# Patient Record
Sex: MALE | Race: WHITE | NOT HISPANIC OR LATINO
[De-identification: names, ages, dates, MRNs, and addresses within clinical notes are randomized per-mention and may not be internally consistent; named-entity substitution may affect disease eponyms.]

---

## 2021-06-03 PROBLEM — Z00.00 ENCOUNTER FOR PREVENTIVE HEALTH EXAMINATION: Status: ACTIVE | Noted: 2021-06-03

## 2021-08-30 ENCOUNTER — APPOINTMENT (OUTPATIENT)
Dept: NEUROLOGY | Facility: CLINIC | Age: 66
End: 2021-08-30

## 2021-08-30 ENCOUNTER — APPOINTMENT (OUTPATIENT)
Dept: NEUROLOGY | Facility: CLINIC | Age: 66
End: 2021-08-30
Payer: MEDICARE

## 2021-08-30 ENCOUNTER — LABORATORY RESULT (OUTPATIENT)
Age: 66
End: 2021-08-30

## 2021-08-30 VITALS
TEMPERATURE: 98 F | BODY MASS INDEX: 31.78 KG/M2 | DIASTOLIC BLOOD PRESSURE: 82 MMHG | HEIGHT: 70 IN | HEART RATE: 82 BPM | OXYGEN SATURATION: 94 % | SYSTOLIC BLOOD PRESSURE: 137 MMHG | WEIGHT: 222 LBS

## 2021-08-30 PROCEDURE — 99205 OFFICE O/P NEW HI 60 MIN: CPT

## 2021-08-30 NOTE — DISCUSSION/SUMMARY
[FreeTextEntry1] : Impression:\par 1) highly refractory OCD, disabling and institutionalized.\par \par Plan:\par 1) MRI brain\par 2) rEEG with next visit\par 3) screen for PANDAs and autoimmunity with serology.

## 2021-08-30 NOTE — HISTORY OF PRESENT ILLNESS
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 \par \[employer] was actually his happiest days of his life.  However he realized then he was attracted to member of his own sex.  This, along with perfectionism, led him to difficulty in his whole life.  He found that he was bullied in school, 'despite not being effeminate'.  He knows there is OCD in his family, in his mother's side.  As a child, he recalls seeing his mother checking and double checking the gas stove settings.\par \par When he was 7-9 yo, he believe he had strep throat, fevers to 107F, and he believes this led to PANDAS, in combination with the other two factors above, the genetic disposition and the guilt/sexual orientation issues while in [employer] school. \par \par He finds he needs to count all the time, everything he sees on the floor, and he needs to feel that everything is right and perfect and not affecting his life.  He has broken locks by rechecking them, though this has since passed.  He feels he is fixated on symmetry, but not fixated on specific numbers.\par \par He has vitiligo and eczema and inguinal itching as a child, which has changed over time, which he feels 'is a disaster'.  He also reports bowel movements are compulsive needing to feel completely empty.  He finds that his libido is increasing, and has always been very high.\par \par Seen by Dermatologists.\par Has used steroid creams -kenalog, steroid creams.\par Has used allegra and claritin, but did not work for him, but perhaps zyrtec helped.\par \wilfrido Has not been tested for ASO / Pandas.\par \par olanzepine, risperdal, aripirprazole, quetiapine.\par SSRI:  prozac, paxil, zoloft, lexapro, he only thing that works:  luvox\par wellbutrin.\par adderall, strattera.\par \par Neudexta is the only thing that is helping him. \par Does not recall being on: clozaril\par  \par Multiple hospitalizations:\par In 1984, his first inpatient psychiatric hospitalization, Sep 21-Oct 24, by memory.\par 1986, second admission, to [facility] for 4 months - imipramine 400, caused constipation.\par In 1993, admitted to [facility] psychiatry.\par ... others\par In 2001 he went to [facility]oc with [facility], for OCD symptoms.  \par He had a psychologist for 3 yrs, but recently stopped and a NP psychiatrist.\par Refractory to multiple medications and hospitalizations.  ECT x 8 treatments unhelpful.\par \par MRI's and EEG's, no PET scan.\par MRI and EEG last performed in 2014 to start neudexta.\par \par Now living in a nursing home.

## 2021-08-31 LAB
ALBUMIN SERPL ELPH-MCNC: 5.2 G/DL
ALP BLD-CCNC: 108 U/L
ALT SERPL-CCNC: 22 U/L
ANION GAP SERPL CALC-SCNC: 13 MMOL/L
ASO AB SER LA-ACNC: 501 IU/ML
AST SERPL-CCNC: 35 U/L
B BURGDOR IGG+IGM SER QL IB: NORMAL
BASOPHILS # BLD AUTO: 0.03 K/UL
BASOPHILS NFR BLD AUTO: 0.3 %
BILIRUB SERPL-MCNC: 1 MG/DL
BUN SERPL-MCNC: 19 MG/DL
CALCIUM SERPL-MCNC: 10.1 MG/DL
CHLORIDE SERPL-SCNC: 102 MMOL/L
CK SERPL-CCNC: 924 U/L
CO2 SERPL-SCNC: 24 MMOL/L
CREAT SERPL-MCNC: 0.95 MG/DL
CRP SERPL-MCNC: <3 MG/L
EOSINOPHIL # BLD AUTO: 0.04 K/UL
EOSINOPHIL NFR BLD AUTO: 0.5 %
ERYTHROCYTE [SEDIMENTATION RATE] IN BLOOD BY WESTERGREN METHOD: 14 MM/HR
ESTIMATED AVERAGE GLUCOSE: 114 MG/DL
FERRITIN SERPL-MCNC: 190 NG/ML
FOLATE SERPL-MCNC: 16 NG/ML
GLUCOSE SERPL-MCNC: 94 MG/DL
HBA1C MFR BLD HPLC: 5.6 %
HCT VFR BLD CALC: 47.6 %
HGB BLD-MCNC: 15.5 G/DL
HIV1+2 AB SPEC QL IA.RAPID: NONREACTIVE
IMM GRANULOCYTES NFR BLD AUTO: 0.3 %
LYMPHOCYTES # BLD AUTO: 2.24 K/UL
LYMPHOCYTES NFR BLD AUTO: 26.1 %
MAGNESIUM SERPL-MCNC: 2.2 MG/DL
MAN DIFF?: NORMAL
MCHC RBC-ENTMCNC: 28.9 PG
MCHC RBC-ENTMCNC: 32.6 GM/DL
MCV RBC AUTO: 88.6 FL
MONOCYTES # BLD AUTO: 0.81 K/UL
MONOCYTES NFR BLD AUTO: 9.4 %
NEUTROPHILS # BLD AUTO: 5.43 K/UL
NEUTROPHILS NFR BLD AUTO: 63.4 %
PLATELET # BLD AUTO: 275 K/UL
POTASSIUM SERPL-SCNC: 4.2 MMOL/L
PROT SERPL-MCNC: 7.9 G/DL
RBC # BLD: 5.37 M/UL
RBC # FLD: 13.5 %
RHEUMATOID FACT SER QL: <10 IU/ML
SODIUM SERPL-SCNC: 139 MMOL/L
T PALLIDUM AB SER QL IA: NEGATIVE
THYROGLOB AB SERPL-ACNC: <20 IU/ML
THYROPEROXIDASE AB SERPL IA-ACNC: 10.6 IU/ML
TSH SERPL-ACNC: 0.98 UIU/ML
VIT B12 SERPL-MCNC: >2000 PG/ML
WBC # FLD AUTO: 8.58 K/UL

## 2021-09-02 LAB
ACE BLD-CCNC: 24 U/L
ENA SS-A AB SER IA-ACNC: <0.2 AL
ENA SS-B AB SER IA-ACNC: <0.2 AL

## 2021-09-03 DIAGNOSIS — Z86.39 PERSONAL HISTORY OF OTHER ENDOCRINE, NUTRITIONAL AND METABOLIC DISEASE: ICD-10-CM

## 2021-09-03 DIAGNOSIS — Z87.898 PERSONAL HISTORY OF OTHER SPECIFIED CONDITIONS: ICD-10-CM

## 2021-09-03 DIAGNOSIS — E53.8 DEFICIENCY OF OTHER SPECIFIED B GROUP VITAMINS: ICD-10-CM

## 2021-09-03 DIAGNOSIS — Z86.59 PERSONAL HISTORY OF OTHER MENTAL AND BEHAVIORAL DISORDERS: ICD-10-CM

## 2021-09-03 DIAGNOSIS — Z78.9 OTHER SPECIFIED HEALTH STATUS: ICD-10-CM

## 2021-09-03 RX ORDER — CHLORHEXIDINE GLUCONATE 4 %
5 LIQUID (ML) TOPICAL
Qty: 30 | Refills: 3 | Status: ACTIVE | COMMUNITY

## 2021-09-03 RX ORDER — ALPRAZOLAM 1 MG/1
1 TABLET ORAL 3 TIMES DAILY
Refills: 0 | Status: ACTIVE | COMMUNITY

## 2021-09-03 RX ORDER — ATORVASTATIN CALCIUM 10 MG/1
10 TABLET, FILM COATED ORAL
Qty: 30 | Refills: 5 | Status: ACTIVE | COMMUNITY

## 2021-09-03 RX ORDER — DEXTROMETHORPHAN HYDROBROMIDE AND QUINIDINE SULFATE 20; 10 MG/1; MG/1
20-10 CAPSULE, GELATIN COATED ORAL
Refills: 0 | Status: ACTIVE | COMMUNITY

## 2021-09-15 LAB
A-TUMOR NECROSIS FACT SERPL-MCNC: <1.7 PG/ML
ALBUMIN MFR SERPL ELPH: 61 %
ALBUMIN SERPL-MCNC: 4.8 G/DL
ALBUMIN/GLOB SERPL: 1.5 RATIO
ALPHA1 GLOB MFR SERPL ELPH: 3.7 %
ALPHA1 GLOB SERPL ELPH-MCNC: 0.3 G/DL
ALPHA2 GLOB MFR SERPL ELPH: 8.8 %
ALPHA2 GLOB SERPL ELPH-MCNC: 0.7 G/DL
ANA SER IF-ACNC: NEGATIVE
B-GLOBULIN MFR SERPL ELPH: 11.9 %
B-GLOBULIN SERPL ELPH-MCNC: 0.9 G/DL
GAD65 AB SER-MCNC: 0 NMOL/L
GAMMA GLOB FLD ELPH-MCNC: 1.2 G/DL
GAMMA GLOB MFR SERPL ELPH: 14.6 %
IGNF SERPL-MCNC: <4.2 PG/ML
IL10 SERPL-MCNC: <2.8 PG/ML
IL12 SERPL-MCNC: <1.9 PG/ML
IL13 SERPL-MCNC: <1.7 PG/ML
IL17A SERPL-MCNC: <1.4 PG/ML
IL2 SERPL-MCNC: 273.1 PG/ML
IL2 SERPL-MCNC: <2.1 PG/ML
IL4 SERPL-MCNC: <2.2 PG/ML
IL6 SERPL-MCNC: <2 PG/ML
IL8 SERPL-MCNC: <3 PG/ML
INTERLEUKIN 1 BETA: <6.5 PG/ML
INTERLEUKIN 5: <2.1 PG/ML
INTERPRETATION SERPL IEP-IMP: NORMAL
PARANEOPLASTIC AB PNL SER: ABNORMAL
PROT SERPL-MCNC: 7.9 G/DL
PROT SERPL-MCNC: 7.9 G/DL
VGKC AB SER-SCNC: 12 PMOL/L

## 2021-09-17 ENCOUNTER — APPOINTMENT (OUTPATIENT)
Dept: MRI IMAGING | Facility: HOSPITAL | Age: 66
End: 2021-09-17

## 2021-09-30 ENCOUNTER — OUTPATIENT (OUTPATIENT)
Dept: OUTPATIENT SERVICES | Facility: HOSPITAL | Age: 66
LOS: 1 days | End: 2021-09-30
Payer: MEDICARE

## 2021-09-30 ENCOUNTER — RESULT REVIEW (OUTPATIENT)
Age: 66
End: 2021-09-30

## 2021-09-30 ENCOUNTER — APPOINTMENT (OUTPATIENT)
Dept: MRI IMAGING | Facility: HOSPITAL | Age: 66
End: 2021-09-30

## 2021-09-30 PROCEDURE — 70551 MRI BRAIN STEM W/O DYE: CPT | Mod: 26,MH

## 2021-09-30 PROCEDURE — 70551 MRI BRAIN STEM W/O DYE: CPT

## 2021-10-01 ENCOUNTER — NON-APPOINTMENT (OUTPATIENT)
Age: 66
End: 2021-10-01

## 2021-10-01 ENCOUNTER — APPOINTMENT (OUTPATIENT)
Dept: NEUROLOGY | Facility: CLINIC | Age: 66
End: 2021-10-01
Payer: MEDICARE

## 2021-10-01 VITALS
HEIGHT: 70 IN | DIASTOLIC BLOOD PRESSURE: 83 MMHG | WEIGHT: 220 LBS | OXYGEN SATURATION: 92 % | SYSTOLIC BLOOD PRESSURE: 134 MMHG | BODY MASS INDEX: 31.5 KG/M2 | HEART RATE: 93 BPM | TEMPERATURE: 97.2 F

## 2021-10-01 PROCEDURE — 99215 OFFICE O/P EST HI 40 MIN: CPT

## 2021-10-01 PROCEDURE — 95816 EEG AWAKE AND DROWSY: CPT

## 2021-10-12 ENCOUNTER — APPOINTMENT (OUTPATIENT)
Dept: OTOLARYNGOLOGY | Facility: CLINIC | Age: 66
End: 2021-10-12
Payer: MEDICARE

## 2021-10-12 VITALS
HEIGHT: 70 IN | DIASTOLIC BLOOD PRESSURE: 74 MMHG | TEMPERATURE: 96.2 F | BODY MASS INDEX: 32.35 KG/M2 | WEIGHT: 226 LBS | SYSTOLIC BLOOD PRESSURE: 121 MMHG | HEART RATE: 80 BPM

## 2021-10-12 DIAGNOSIS — H61.23 IMPACTED CERUMEN, BILATERAL: ICD-10-CM

## 2021-10-12 DIAGNOSIS — H91.93 UNSPECIFIED HEARING LOSS, BILATERAL: ICD-10-CM

## 2021-10-12 PROCEDURE — 99204 OFFICE O/P NEW MOD 45 MIN: CPT

## 2021-10-12 PROCEDURE — 69210 REMOVE IMPACTED EAR WAX UNI: CPT

## 2021-10-12 NOTE — PHYSICAL EXAM
[FreeTextEntry1] : Ad: EAC occluded w severe cerumen, removed w curet and suction. TM intact and mobile, ME clear\par \par marked improvement in hearing after disimpaction [Midline] : trachea located in midline position [Normal] : no rashes

## 2021-11-04 NOTE — HISTORY OF PRESENT ILLNESS
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 \par \par Accompanied by his sister today.\par \par Eval for a neuro-autoimmune disorder last visit included labs, which did show an elevated ASO and AChR shon-ganglionic antibody (alpha-3), thought relatively low titer at 0.04, the [place] lab estimated a 46% chance it could be causing neurological disease.  As well there is an increased risk it is related to a neoplasm.\par \par Background:  at  7-7 yo, he believe he had strep throat, fevers to 107F, and he believes this led to PANDAS, in combination with the other two factors above, the genetic disposition and the guilt/sexual orientation issues while in [employer]. \par \par He finds he needs to count all the time, everything he sees on the floor, and he needs to feel that everything is right and perfect and not affecting his life.  He has broken locks by rechecking them, though this has since passed.  He feels he is fixated on symmetry, but not fixated on specific numbers.\par \par He has vitiligo and eczema and inguinal itching as a child, which has changed over time, which he feels 'is a disaster'.  He also reports bowel movements are compulsive needing to feel completely empty.  He finds that his libido is increasing, and has always been very high.\par \par Seen by Dermatologists.\par Has used steroid creams -kenalog, steroid creams.\par Has used allegra and claritin, but did not work for him, but perhaps zyrtec helped.\par \par Prior trials, with failures: olanzepine, risperdal, aripirprazole, quetiapine.\par SSRI:  prozac, paxil, zoloft, lexapro, he only thing that works:  luvox\par wellbutrin.\par adderall, strattera.\par \par Neudexta is the only thing that is helping him. \par Does not recall being on: clozaril\par  \par Multiple hospitalizations:\par In 1984, his first inpatient psychiatric hospitalization, Sep 21-Oct 24, by memory.\par 1986, second admission, to [facility] for 4 months - imipramine 400, caused constipation.\par In 1993, admitted to [facility] psychiatry.\par ... others\par In 2001 he went to [facility]oc with [facility], for OCD symptoms.  \par He had a psychologist for 3 yrs, but recently stopped and a NP psychiatrist.\par Refractory to multiple medications and hospitalizations.  ECT x 8 treatments unhelpful.\par \par MRI's and EEG's, no PET scan.\par MRI and EEG last performed in 2014 to start neudexta.

## 2021-11-04 NOTE — PHYSICAL EXAM
[FreeTextEntry1] : General:\par Constitutional:  Sitting comfortably\par Psychiatric: well-groomed, agitated, cannot stop talking about the past and the severity of his symptoms.\par Ears, Nose, Throat: no abnormalities, mucus membranes moist\par Neck: supple\par Extremities: no edema, clubbing or cyanosis\par Skin: no rash or neuro-cutaneous signs \par \par Cognitive:\par Orientation, language, memory and knowledge screens intact.\par \par Cranial Nerves:\par II: MARILYNN. III/IV/VI: EOM Full.  VII: Face appears symmetric VIII: Normal to screening\par IX/X: normal phonation  XI: Trapezius Symmetric  XII: Tongue midline\par Motor:\par Power: no pronator drift\par \par Narrow based gait

## 2021-11-04 NOTE — DISCUSSION/SUMMARY
[FreeTextEntry1] : Impression:\par 1) highly refractory OCD, disabling and institutionalized.  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 \par \par Plan:\par 1) genetic testing for OCD to consider\par 2) neoplasm screen - CT CAP as oupt\par 3) lumbar puncture for paraneoplastic CSF panel, consider outpt given the severity of his symptoms and difficulty with others.\par 4) consider trial of minocycline for mild anit-inflammatory

## 2021-11-30 PROBLEM — Z00.00 ENCOUNTER FOR PREVENTIVE HEALTH EXAMINATION: Noted: 2021-11-30

## 2021-12-09 ENCOUNTER — APPOINTMENT (OUTPATIENT)
Dept: CT IMAGING | Facility: HOSPITAL | Age: 66
End: 2021-12-09
Payer: MEDICARE

## 2021-12-09 ENCOUNTER — OUTPATIENT (OUTPATIENT)
Dept: OUTPATIENT SERVICES | Facility: HOSPITAL | Age: 66
LOS: 1 days | End: 2021-12-09
Payer: MEDICARE

## 2021-12-09 ENCOUNTER — RESULT REVIEW (OUTPATIENT)
Age: 66
End: 2021-12-09

## 2021-12-09 ENCOUNTER — APPOINTMENT (OUTPATIENT)
Dept: NEUROLOGY | Facility: CLINIC | Age: 66
End: 2021-12-09
Payer: MEDICARE

## 2021-12-09 VITALS
DIASTOLIC BLOOD PRESSURE: 82 MMHG | BODY MASS INDEX: 31.92 KG/M2 | OXYGEN SATURATION: 95 % | SYSTOLIC BLOOD PRESSURE: 131 MMHG | TEMPERATURE: 98.1 F | HEART RATE: 79 BPM | WEIGHT: 223 LBS | HEIGHT: 70 IN

## 2021-12-09 DIAGNOSIS — G04.81 OTHER ENCEPHALITIS AND ENCEPHALOMYELITIS: ICD-10-CM

## 2021-12-09 PROCEDURE — 71271 CT THORAX LUNG CANCER SCR C-: CPT | Mod: 26

## 2021-12-09 PROCEDURE — 74178 CT ABD&PLV WO CNTR FLWD CNTR: CPT

## 2021-12-09 PROCEDURE — 74178 CT ABD&PLV WO CNTR FLWD CNTR: CPT | Mod: 26,MH

## 2021-12-09 PROCEDURE — 71250 CT THORAX DX C-: CPT | Mod: 26

## 2021-12-09 PROCEDURE — 99214 OFFICE O/P EST MOD 30 MIN: CPT

## 2021-12-09 PROCEDURE — 71271 CT THORAX LUNG CANCER SCR C-: CPT

## 2021-12-13 LAB
ALBUMIN SERPL ELPH-MCNC: 5 G/DL
ALP BLD-CCNC: 113 U/L
ALT SERPL-CCNC: 22 U/L
ANA SER IF-ACNC: NEGATIVE
ANION GAP SERPL CALC-SCNC: 15 MMOL/L
ASO AB SER LA-ACNC: 480 IU/ML
AST SERPL-CCNC: 34 U/L
B2 GLYCOPROT1 AB SER QL: NEGATIVE
BASOPHILS # BLD AUTO: 0.03 K/UL
BASOPHILS NFR BLD AUTO: 0.3 %
BILIRUB SERPL-MCNC: 0.6 MG/DL
BUN SERPL-MCNC: 22 MG/DL
C3 SERPL-MCNC: 142 MG/DL
C4 SERPL-MCNC: 34 MG/DL
CALCIUM SERPL-MCNC: 10.2 MG/DL
CH50 SERPL-MCNC: 93 U/ML
CHLORIDE SERPL-SCNC: 102 MMOL/L
CO2 SERPL-SCNC: 22 MMOL/L
COVID-19 NUCLEOCAPSID  GAM ANTIBODY INTERPRETATION: POSITIVE
COVID-19 SPIKE DOMAIN ANTIBODY INTERPRETATION: POSITIVE
CREAT SERPL-MCNC: 1.06 MG/DL
CRP SERPL-MCNC: <3 MG/L
DSDNA AB SER-ACNC: <12 IU/ML
EOSINOPHIL # BLD AUTO: 0.03 K/UL
EOSINOPHIL NFR BLD AUTO: 0.3 %
ERYTHROCYTE [SEDIMENTATION RATE] IN BLOOD BY WESTERGREN METHOD: 15 MM/HR
FOLATE SERPL-MCNC: 12.5 NG/ML
GLUCOSE SERPL-MCNC: 86 MG/DL
HCT VFR BLD CALC: 46.5 %
HGB BLD-MCNC: 15.1 G/DL
IMM GRANULOCYTES NFR BLD AUTO: 0.3 %
INR PPP: 1 RATIO
LYMPHOCYTES # BLD AUTO: 2.14 K/UL
LYMPHOCYTES NFR BLD AUTO: 21.4 %
MAN DIFF?: NORMAL
MCHC RBC-ENTMCNC: 28.8 PG
MCHC RBC-ENTMCNC: 32.5 GM/DL
MCV RBC AUTO: 88.7 FL
MONOCYTES # BLD AUTO: 1 K/UL
MONOCYTES NFR BLD AUTO: 10 %
NEUTROPHILS # BLD AUTO: 6.76 K/UL
NEUTROPHILS NFR BLD AUTO: 67.7 %
PLATELET # BLD AUTO: 281 K/UL
POTASSIUM SERPL-SCNC: 4.6 MMOL/L
PROT SERPL-MCNC: 7.9 G/DL
PT BLD: 11.8 SEC
RBC # BLD: 5.24 M/UL
RBC # FLD: 13.3 %
RHEUMATOID FACT SER QL: <10 IU/ML
SARS-COV-2 AB SERPL IA-ACNC: >250 U/ML
SARS-COV-2 AB SERPL QL IA: 89.4 INDEX
SODIUM SERPL-SCNC: 139 MMOL/L
THYROGLOB AB SERPL-ACNC: <20 IU/ML
THYROPEROXIDASE AB SERPL IA-ACNC: <10 IU/ML
TSH SERPL-ACNC: 1.36 UIU/ML
VIT B12 SERPL-MCNC: >2000 PG/ML
WBC # FLD AUTO: 9.99 K/UL

## 2021-12-14 LAB
A-TUMOR NECROSIS FACT SERPL-MCNC: <1.7 PG/ML
IGNF SERPL-MCNC: <4.2 PG/ML
IL10 SERPL-MCNC: 6.7 PG/ML
IL12 SERPL-MCNC: <1.9 PG/ML
IL13 SERPL-MCNC: <1.7 PG/ML
IL17A SERPL-MCNC: <1.4 PG/ML
IL2 SERPL-MCNC: 319.5 PG/ML
IL2 SERPL-MCNC: <2.1 PG/ML
IL4 SERPL-MCNC: <2.2 PG/ML
IL6 SERPL-MCNC: <2 PG/ML
IL8 SERPL-MCNC: <3 PG/ML
INTERLEUKIN 1 BETA: <6.5 PG/ML
INTERLEUKIN 5: <2.1 PG/ML

## 2021-12-18 PROBLEM — G04.81 AUTOIMMUNE ENCEPHALITIS: Status: ACTIVE | Noted: 2021-12-18

## 2021-12-18 NOTE — DISCUSSION/SUMMARY
[FreeTextEntry1] : Impression:\par 1) highly refractory OCD, disabling and institutionalized.  AChR ganglionic antibodies positive 0.04 and ASO titer 501 are leads to f/u on the refractoriness. \par \par Plan:\par 1) genetic testing for OCD\par 2) lumbar puncture for paraneoplastic CSF panel, outpatient through IR.\par 3) trial of minocycline for mild anti-inflammatory

## 2021-12-18 NOTE — PHYSICAL EXAM
[FreeTextEntry1] : General:\par Constitutional:  Sitting comfortably, a bit excited but reasonably well controlled.\par Psychiatric: well-groomed, appropriate affect\par Ears, Nose, Throat: no abnormalities, mucus membranes moist\par Neck: supple\par Extremities: no edema, clubbing or cyanosis\par Skin: no rash or neuro-cutaneous signs \par \par Cognitive:\par Orientation, language, memory and knowledge screens intact.\par \par Cranial Nerves:\par II: MARILYNN. III/IV/VI: EOM Full.  VII: Face appears symmetric VIII: Normal to screening\par IX/X: normal phonation  XI: Trapezius Symmetric  XII: Tongue midline\par Motor:\par Power: no pronator drift\par \par Narrow based gait\par \par \par \par

## 2021-12-18 NOTE — HISTORY OF PRESENT ILLNESS
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 \par \par Accompanied by his 1st cousin, Mary, and an aide today.\par \par MRI brain Sep 30, 2021, EEG Oct 1, 2021 negative\par \par Following the elevated ASO and AChR anii-ganglionic antibody (alpha-3), though relatively low titer at 0.04, the [place] lab estimated a 46% chance it could be causing neurological disease.  As well there is an increased risk it is related to a neoplasm.\par \par Background:  at  7-9 yo, he believe he had strep throat, fevers to 107F, and he believes this led to PANDAS, in combination with the other two factors above, the genetic disposition and the guilt/sexual orientation issues while in [employer] school. \par \par He finds he needs to count all the time, everything he sees on the floor, and he needs to feel that everything is right and perfect and not affecting his life.  He has broken locks by rechecking them, though this has since passed.  He feels he is fixated on symmetry, but not fixated on specific numbers.\par \par He has vitiligo and eczema and inguinal itching as a child, which has changed over time, which he feels 'is a disaster'.  He also reports bowel movements are compulsive needing to feel completely empty.  He finds that his libido is increasing, and has always been very high.\par \par Seen by Dermatologists.\par Has used steroid creams -kenalog, steroid creams.\par Has used allegra and claritin, but did not work for him, but perhaps zyrtec helped.\par \par Prior trials, with failures: olanzepine, risperdal, aripirprazole, quetiapine.\par SSRI:  prozac, paxil, zoloft, lexapro, he only thing that works:  luvox\par wellbutrin.\par adderall, strattera.\par \par Neudexta is the only thing that is helping him. \par Does not recall being on: clozaril\par  \par Multiple hospitalizations:\par In 1984, his first inpatient psychiatric hospitalization, Sep 21-Oct 24, by memory.\par 1986, second admission, to [facility] for 4 months - imipramine 400, caused constipation.\par In 1993, admitted to [facility] psychiatry.\par ... others\par In 2001 he went to [facility], for OCD symptoms.  \par He had a psychologist for 3 yrs, but recently stopped and a NP psychiatrist.\par Refractory to multiple medications and hospitalizations.  ECT x 8 treatments unhelpful.\par

## 2021-12-22 LAB
GAD65 AB SER-MCNC: 0 NMOL/L
PARANEOPLASTIC AB PNL SER: NORMAL

## 2021-12-27 DIAGNOSIS — Q45.3 OTHER CONGENITAL MALFORMATIONS OF PANCREAS AND PANCREATIC DUCT: ICD-10-CM

## 2022-02-18 ENCOUNTER — APPOINTMENT (OUTPATIENT)
Dept: INTERVENTIONAL RADIOLOGY/VASCULAR | Facility: HOSPITAL | Age: 67
End: 2022-02-18

## 2022-02-18 ENCOUNTER — RESULT REVIEW (OUTPATIENT)
Age: 67
End: 2022-02-18

## 2022-02-18 ENCOUNTER — OUTPATIENT (OUTPATIENT)
Dept: OUTPATIENT SERVICES | Facility: HOSPITAL | Age: 67
LOS: 1 days | End: 2022-02-18
Payer: MEDICARE

## 2022-02-18 LAB
APPEARANCE CSF: CLEAR — SIGNIFICANT CHANGE UP
APPEARANCE SPUN FLD: COLORLESS — SIGNIFICANT CHANGE UP
COLOR CSF: SIGNIFICANT CHANGE UP
GLUCOSE CSF-MCNC: 65 MG/DL — SIGNIFICANT CHANGE UP (ref 40–70)
NEUTROPHILS # CSF: 0 % — SIGNIFICANT CHANGE UP (ref 0–6)
NRBC NFR CSF: 0 /UL — SIGNIFICANT CHANGE UP (ref 0–5)
PROT CSF-MCNC: 67 MG/DL — HIGH (ref 15–45)
RBC # CSF: 3 /UL — HIGH (ref 0–0)
TUBE TYPE: SIGNIFICANT CHANGE UP

## 2022-02-18 PROCEDURE — 83519 RIA NONANTIBODY: CPT

## 2022-02-18 PROCEDURE — 89051 BODY FLUID CELL COUNT: CPT

## 2022-02-18 PROCEDURE — 86341 ISLET CELL ANTIBODY: CPT

## 2022-02-18 PROCEDURE — 83916 OLIGOCLONAL BANDS: CPT

## 2022-02-18 PROCEDURE — 86618 LYME DISEASE ANTIBODY: CPT

## 2022-02-18 PROCEDURE — 82784 ASSAY IGA/IGD/IGG/IGM EACH: CPT

## 2022-02-18 PROCEDURE — 84157 ASSAY OF PROTEIN OTHER: CPT

## 2022-02-18 PROCEDURE — 62328 DX LMBR SPI PNXR W/FLUOR/CT: CPT

## 2022-02-18 PROCEDURE — 86256 FLUORESCENT ANTIBODY TITER: CPT

## 2022-02-18 PROCEDURE — 36415 COLL VENOUS BLD VENIPUNCTURE: CPT

## 2022-02-18 PROCEDURE — 86255 FLUORESCENT ANTIBODY SCREEN: CPT

## 2022-02-18 PROCEDURE — 82945 GLUCOSE OTHER FLUID: CPT

## 2022-02-18 PROCEDURE — 83873 ASSAY OF CSF PROTEIN: CPT

## 2022-02-21 LAB
ALBUMIN CSF-MCNC: 37.9 MG/DL — HIGH (ref 14–25)
ALBUMIN SERPL ELPH-MCNC: 4316 MG/DL — SIGNIFICANT CHANGE UP (ref 3500–5200)
B BURGDOR C6 AB SER-ACNC: NEGATIVE — SIGNIFICANT CHANGE UP
B BURGDOR IGG+IGM SER-ACNC: 0.02 INDEX — SIGNIFICANT CHANGE UP (ref 0.01–0.89)
IGG CSF-MCNC: 4.8 MG/DL — HIGH
IGG CSF-MCNC: 4.8 MG/DL — HIGH
IGG FLD-MCNC: 1023 MG/DL — SIGNIFICANT CHANGE UP (ref 610–1660)
IGG SYNTH RATE SER+CSF CALC-MRATE: 0.4 MG/DAY — SIGNIFICANT CHANGE UP
IGG/ALB CLEAR SER+CSF-RTO: 0.5 — SIGNIFICANT CHANGE UP
IGG/ALB CSF: 0.13 RATIO — SIGNIFICANT CHANGE UP
IGG/ALB SER: 0.24 RATIO — SIGNIFICANT CHANGE UP
MBP CSF-MCNC: 6.3 NG/ML — HIGH (ref 0–5.4)

## 2022-02-22 LAB
OLIGOCLONAL BANDS CSF ELPH-IMP: SIGNIFICANT CHANGE UP
VOLTAGE-GATED K CHANNEL AB RESULT: 0 PMOL/L — SIGNIFICANT CHANGE UP (ref 0–31)

## 2022-02-28 LAB
AMPA-R AB CBA, CSF: NEGATIVE — SIGNIFICANT CHANGE UP
AMPHIPHYSIN AB TITR CSF: NEGATIVE TITER — SIGNIFICANT CHANGE UP
CASPR2-IGG CBA, CSF: NEGATIVE — SIGNIFICANT CHANGE UP
CV2 IGG TITR CSF: NEGATIVE TITER — SIGNIFICANT CHANGE UP
DPPX ANTIBODY IFA, CSF: NEGATIVE — SIGNIFICANT CHANGE UP
GABA-B-R AB CBA, CSF: NEGATIVE — SIGNIFICANT CHANGE UP
GAD65 AB CSF-SCNC: 0 NMOL/L — SIGNIFICANT CHANGE UP
GFAP IFA, CSF: NEGATIVE — SIGNIFICANT CHANGE UP
GLIAL NUC TYPE 1 AB TITR CSF: NEGATIVE TITER — SIGNIFICANT CHANGE UP
HU1 AB TITR CSF IF: NEGATIVE TITER — SIGNIFICANT CHANGE UP
HU2 AB TITR CSF IF: NEGATIVE TITER — SIGNIFICANT CHANGE UP
HU3 AB TITR CSF: NEGATIVE TITER — SIGNIFICANT CHANGE UP
IGLON5 IFA, CSF: NEGATIVE — SIGNIFICANT CHANGE UP
IMMUNOLOGIST REVIEW: SIGNIFICANT CHANGE UP
LGI1-IGG CBA, CSF: NEGATIVE — SIGNIFICANT CHANGE UP
MGLUR1 AB IFA, CSF: NEGATIVE — SIGNIFICANT CHANGE UP
NIF IFA, CSF: NEGATIVE — SIGNIFICANT CHANGE UP
NMDA-R AB CBA, CSF: NEGATIVE — SIGNIFICANT CHANGE UP
PCA-TR AB TITR CSF: NEGATIVE TITER — SIGNIFICANT CHANGE UP
PURKINJE CELL CYTOPLASMIC AB TYPE 2: NEGATIVE TITER — SIGNIFICANT CHANGE UP
PURKINJE CELLS AB TITR CSF IF: NEGATIVE TITER — SIGNIFICANT CHANGE UP
REFLEX ADDED: SIGNIFICANT CHANGE UP

## 2022-03-07 ENCOUNTER — APPOINTMENT (OUTPATIENT)
Dept: NEUROLOGY | Facility: CLINIC | Age: 67
End: 2022-03-07

## 2022-03-10 ENCOUNTER — APPOINTMENT (OUTPATIENT)
Dept: NEUROLOGY | Facility: CLINIC | Age: 67
End: 2022-03-10
Payer: MEDICARE

## 2022-03-10 PROCEDURE — 99443: CPT | Mod: 95

## 2022-03-16 NOTE — HISTORY OF PRESENT ILLNESS
[FreeTextEntry1] : \par Mario (or Steven) is a 65 yo with severe OCD, institutionalized.  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 \par \par He was able to have the LP performed as an outpatient, Feb 18, 2022.\par The test showed elevation in CSF protein and myelin-basic protein, but no other abnormalities, including from the encephalitis panel sent to [place].\par \par Prior tests:\par MRI brain Sep 30, 2021, EEG Oct 1, 2021 negative\par Following the elevated ASO and AChR anii-ganglionic antibody (alpha-3), though relatively low titer at 0.04, the [place] lab estimated a 46% chance it could be causing neurological disease.  As well there is an increased risk it is related to a neoplasm.\par \par Minocycline which was to be used to decrease microglial activation, seemed to caused severe constipation, which was very alarming to him and stopped.\par \par Background:  at  7-7 yo, he believe he had strep throat, fevers to 107F, and he believes this led to PANDAS, in combination with the other two factors above, the genetic disposition and the guilt/sexual orientation issues while in [employer]. \par \par He finds he needs to count all the time, everything he sees on the floor, and he needs to feel that everything is right and perfect and not affecting his life.  He has broken locks by rechecking them, though this has since passed.  He feels he is fixated on symmetry, but not fixated on specific numbers.\par \par He has vitiligo and eczema and inguinal itching as a child, which has changed over time, which he feels 'is a disaster'.  He also reports bowel movements are compulsive needing to feel completely empty.  He finds that his libido is increasing, and has always been very high.\par \par Seen by Dermatologists.  Has used steroid creams -kenalog, steroid creams.\par Has used allegra and claritin, but did not work for him, but perhaps zyrtec helped.\par \par Prior trials, with failures: olanzepine, risperdal, aripirprazole, quetiapine.\par SSRI:  prozac, paxil, zoloft, lexapro, he only thing that works:  luvox\par wellbutrin.\par adderall, strattera.\par \par Neudexta is the only thing that is helping him. \par Does not recall being on: clozaril\par  \par Multiple hospitalizations:\par In 1984, his first inpatient psychiatric hospitalization, Sep 21-Oct 24, by memory.\par 1986, second admission, to [facility] for 4 months - imipramine 400, caused constipation.\par In 1993, admitted to [facility] psychiatry.\par ... others\par In 2001 he went to [facility], for OCD symptoms.  \par He had a psychologist for 3 yrs, but recently stopped and a NP psychiatrist.\par Refractory to multiple medications and hospitalizations.  ECT x 8 treatments unhelpful.

## 2022-03-16 NOTE — DISCUSSION/SUMMARY
[FreeTextEntry1] : Impression:\par 1) highly refractory OCD, disabling and institutionalized.  AChR ganglionic antibodies positive 0.04 and ASO titer 501, and CSF showed non-specific markers of inflammation, with elevated protein and myelin basic protein.  There is enough information to question an inflammatory or autoimmune contribution to his symptoms, first round of treatment would be high dose steroids.  However this could also cause destabilization of psychiatric condition, and a test dose of low dose steroids in his own environment is preferred, such as oral medrol dose pack.  There were a multitude of issues he raised, but he agreed that he would be willing to try the low dose and take it day by day.\par 2) genetic testing for OCD negative\par 3) very pleased with neudexta, found it was the most helpful medication by far.\par \par Plan:\par 1) recommending test trial of medrol dose pack - 6 days, 24mg dropping by 4mg daily.\par

## 2022-03-16 NOTE — CONSULT LETTER
[Dear  ___] : Dear  [unfilled], [FreeTextEntry2] : Dr Margot You\par Fax: [phone] [FreeTextEntry1] : In follow-up to our conversation earlier this week, attached is the note for this telephone visit I had with Mr. Akhtar.  Please feel free to reach out should there be any questions or concerns.\par \par Thanks once again for the collaboration.\par \par Sincerely,\par \par Samuel Sheehan MD MSc\par Vice-Chair, Neurology

## 2022-04-08 ENCOUNTER — APPOINTMENT (OUTPATIENT)
Dept: NEUROLOGY | Facility: CLINIC | Age: 67
End: 2022-04-08
Payer: MEDICARE

## 2022-04-08 PROCEDURE — 99449 NTRPROF PH1/NTRNET/EHR 31/>: CPT

## 2022-04-08 NOTE — HISTORY OF PRESENT ILLNESS
[FreeTextEntry1] : Mario (claudia Harris) is a 65 yo with severe OCD, institutionalized.  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 \par \par He reports having a difficult time in his life and his nursing home. He recalls there was a 5d course at 40mg/d, likely prednisone.  He felt no different during the course.  No worsening in his OCD/psychiatric symptoms, no changes in sleeping/eating, no constipation.  No therapeutic changes/improvement.\par \par Multiple hospitalizations:\par In 1984, his first inpatient psychiatric hospitalization, Sep 21-Oct 24, by memory at [facility].\par He was unable to function with the level of OCD, but was very difficult to describe the condition.  Anxiety and loneliness.  The medications did not help.  Luvox not helpful.\par 1986, second admission, to [facility] for 4 months - imipramine 400, caused constipation.\par In 1993, admitted to [facility] psychiatry.\par ... others\par In 2001 he went to [facility], for OCD symptoms.  Was a let-down.\par He had a psychologist for 3 yrs, but recently stopped and a NP psychiatrist.\par Refractory to multiple medications and hospitalizations. \par In 2015, [facility] attempted ECT.   ECT x 8 treatments unhelpful.\par In 2016 [facility] told him they could no longer 'babysit' him and found placement in his present home and has been admitted for 6 years in the nursing home.  At the time he did not wish to return to his own home with his parents.  His mother in in her 90's living with a sister who is over 100.\par Covered by Medicare/medicaid and SSI.\par \par Prior trials, with failures: olanzepine, risperdal, aripirprazole, quetiapine.\par SSRI:  prozac, paxil, zoloft, lexapro, he only thing that works:  luvox\par wellbutrin.\par adderall, strattera.\par \par Neudexta is the only thing that is helping him. \par Does not recall being on: clozaril\par \par Minocycline which was to be used to decrease microglial activation, seemed to caused severe constipation, which was very alarming to him and stopped.\par \par He was able to have the LP performed as an outpatient, Feb 18, 2022.\par The test showed elevation in CSF protein and myelin-basic protein, but no other abnormalities, including from the encephalitis panel sent to [place].\par \par Prior tests:\par MRI brain Sep 30, 2021, EEG Oct 1, 2021 negative\par Following the elevated ASO and AChR anii-ganglionic antibody (alpha-3), though relatively low titer at 0.04, the [place] lab estimated a 46% chance it could be causing neurological disease.  As well there is an increased risk it is related to a neoplasm.\par \par Background:  at  7-7 yo, he believe he had strep throat, fevers to 107F, and he believes this led to PANDAS, in combination with the other two factors above, the genetic disposition and the guilt/sexual orientation issues while in [employer]. \par \par He finds he needs to count all the time, everything he sees on the floor, and he needs to feel that everything is right and perfect and not affecting his life.  He has broken locks by rechecking them, though this has since passed.  He feels he is fixated on symmetry, but not fixated on specific numbers.\par \par He has vitiligo and eczema and inguinal itching as a child, which has changed over time, which he feels 'is a disaster'.  He also reports bowel movements are compulsive needing to feel completely empty.  He finds that his libido is increasing, and has always been very high.\par \par Seen by Dermatologists.  Has used steroid creams -kenalog, steroid creams.\par Has used allegra and claritin, but did not work for him, but perhaps zyrtec helped.\par \par \par  \par

## 2022-04-08 NOTE — REASON FOR VISIT
[Home] : at home, [unfilled] , at the time of the visit. [Medical Office: ([facility])___] : at the medical office located in  [Verbal consent obtained from patient] : the patient, [unfilled]

## 2022-04-08 NOTE — DISCUSSION/SUMMARY
[FreeTextEntry1] : Impression:\par 1) highly refractory OCD, disabling and institutionalized.  AChR ganglionic antibodies positive 0.04 and ASO titer 501 are leads to f/u on the refractoriness. \par \par Plan:\par 1) trial of IV steroids, 1gm solumedrol x 3.  Will have to coordinate with his team.\par

## 2022-06-30 ENCOUNTER — APPOINTMENT (OUTPATIENT)
Dept: NEUROLOGY | Facility: CLINIC | Age: 67
End: 2022-06-30

## 2022-06-30 DIAGNOSIS — D89.89 OTHER SPECIFIED DISORDERS INVOLVING THE IMMUNE MECHANISM, NOT ELSEWHERE CLASSIFIED: ICD-10-CM

## 2022-06-30 PROCEDURE — 99449 NTRPROF PH1/NTRNET/EHR 31/>: CPT

## 2022-06-30 NOTE — HISTORY OF PRESENT ILLNESS
[FreeTextEntry1] : Mario (or Steven) is a 65 yo with severe OCD, institutionalized.  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 \par \par We did a telephone call with his cousin and his sister Shannon.\par We discussed the blood and CSF results again with the family.\par The first AChR ganglionic antibody.  \par \par Has had a 5d course at 40mg/d, likely prednisone.  He felt no different during the course.  No worsening in his OCD/psychiatric symptoms, no changes in sleeping/eating, no constipation.  No therapeutic changes/improvement.\par \par Multiple hospitalizations:\par In 1984, his first inpatient psychiatric hospitalization, Sep 21-Oct 24, by memory at [facility].\par He was unable to function with the level of OCD, but was very difficult to describe the condition.  Anxiety and loneliness.  The medications did not help.  Luvox not helpful.\par 1986, second admission, to [facility] for 4 months - imipramine 400, caused constipation.\par In 1993, admitted to [facility] psychiatry.\par ... others\par In 2001 he went to [facility], for OCD symptoms.  Was a let-down.\par He had a psychologist for 3 yrs, but recently stopped and a NP psychiatrist.\par Refractory to multiple medications and hospitalizations. \par [facility].   ECT x 8 treatments unhelpful.\par In 2016 [facility] told him they could no longer 'babysit' him and found placement in his present home and has been admitted for 6 years in the nursing home.  At the time he did not wish to return to his own home with his parents.  His mother in in her 90's living with a sister who is over 100.\par Covered by Medicare/medicaid and SSI.\par \par Prior trials, with failures: olanzepine, risperdal, aripirprazole, quetiapine.\par SSRI:  prozac, paxil, zoloft, lexapro, he only thing that works:  luvox\par wellbutrin.\par adderall, strattera.\par \par Neudexta is the only thing that is helping him. \par Does not recall being on: clozaril\par \par Minocycline which was to be used to decrease microglial activation, seemed to caused severe constipation, which was very alarming to him and stopped.\par \par He was able to have the LP performed as an outpatient, Feb 18, 2022.\par The test showed elevation in CSF protein and myelin-basic protein, but no other abnormalities, including from the encephalitis panel sent to [place].\par \par Prior tests:\par MRI brain Sep 30, 2021, EEG Oct 1, 2021 negative\par Following the elevated ASO and AChR anii-ganglionic antibody (alpha-3), though relatively low titer at 0.04, the [place] lab estimated a 46% chance it could be causing neurological disease.  As well there is an increased risk it is related to a neoplasm.\par \par Background:  at  7-7 yo, he believe he had strep throat, fevers to 107F, and he believes this led to PANDAS, in combination with the other two factors above, the genetic disposition and the guilt/sexual orientation issues while in [employer] school. \par \par He finds he needs to count all the time, everything he sees on the floor, and he needs to feel that everything is right and perfect and not affecting his life.  He has broken locks by rechecking them, though this has since passed.  He feels he is fixated on symmetry, but not fixated on specific numbers.\par \par He has vitiligo and eczema and inguinal itching as a child, which has changed over time, which he feels 'is a disaster'.  He also reports bowel movements are compulsive needing to feel completely empty.  He finds that his libido is increasing, and has always been very high.\par \par Seen by Dermatologists.  Has used steroid creams -kenalog, steroid creams.\par Has used allegra and claritin, but did not work for him, but perhaps zyrtec helped.

## 2022-06-30 NOTE — DISCUSSION/SUMMARY
[FreeTextEntry1] : Impression:\par 1) highly refractory OCD, disabling and institutionalized.  AChR ganglionic antibodies positive 0.04 and ASO titer 501 are leads to f/u on the refractoriness. \par genetic testing for OCD negative\par \par Plan:\par 1) will arrange IV methylprednisolone test dose of 500mg at [facility].\par 2) if does well above, then will continue with weekly 1gm x 4.

## 2022-07-08 RX ORDER — METHYLPREDNISOLONE SODIUM SUCCINATE 500 MG/8ML
500 INJECTION INTRAMUSCULAR; INTRAVENOUS
Qty: 1 | Refills: 0 | Status: ACTIVE | OUTPATIENT
Start: 2022-07-07

## 2022-07-20 ENCOUNTER — NON-APPOINTMENT (OUTPATIENT)
Age: 67
End: 2022-07-20

## 2022-08-01 ENCOUNTER — FORM ENCOUNTER (OUTPATIENT)
Age: 67
End: 2022-08-01

## 2022-08-01 PROBLEM — D89.89 AUTOIMMUNE DISORDER: Status: ACTIVE | Noted: 2021-08-30

## 2022-09-01 ENCOUNTER — APPOINTMENT (OUTPATIENT)
Dept: NEUROLOGY | Facility: CLINIC | Age: 67
End: 2022-09-01

## 2022-09-01 DIAGNOSIS — R76.0 RAISED ANTIBODY TITER: ICD-10-CM

## 2022-09-01 DIAGNOSIS — F42.9 OBSESSIVE-COMPULSIVE DISORDER, UNSPECIFIED: ICD-10-CM

## 2022-09-01 PROCEDURE — 99449 NTRPROF PH1/NTRNET/EHR 31/>: CPT

## 2022-09-01 NOTE — DISCUSSION/SUMMARY
[FreeTextEntry1] : \par Impression:\par 1) Disabling OCD childhood onset, question of PANDAS: inflammatory markers and an AChR antibody, but not present on repeat tests.  Solumedrol pulse and daily not effective and no adverse effects either.   Spoke to patient about this, likely a genetic predisposition with environmental triggers/amplification.  This treatment likely too little, too late, or not the right type of treatment.  Phone call was cut off several times, and called back, but the last 2 times could not reach him and left voicemail.\par \par Plan: \par 1) Further treatments appear futile and not productive in terms of false hopes.  \par

## 2022-09-01 NOTE — HISTORY OF PRESENT ILLNESS
[FreeTextEntry1] : Mario (or Steven) is a 67 yo with severe OCD, institutionalized.  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 \par \par IV solumedrol given up to x 6 weeks then daily x 6, but no impact on him - no improvement and no adverse effects.  He feels it was similar to him as ECT.\par \par \par We did a telephone call with his cousin and his sister Shannon.\par We discussed the blood and CSF results again with the family.\par The first AChR ganglionic antibody.  \par \par Has had a 5d course at 40mg/d, likely prednisone.  He felt no different during the course.  No worsening in his OCD/psychiatric symptoms, no changes in sleeping/eating, no constipation.  No therapeutic changes/improvement.\par \par Multiple hospitalizations:\par In 1984, his first inpatient psychiatric hospitalization, Sep 21-Oct 24, by memory at [facility].\par He was unable to function with the level of OCD, but was very difficult to describe the condition.  Anxiety and loneliness.  The medications did not help.  Luvox not helpful.\par 1986, second admission, to [facility] for 4 months - imipramine 400, caused constipation.\par In 1993, admitted to [facility] psychiatry.\par ... others\par In 2001 he went to [facility], for OCD symptoms.  Was a let-down.\par He had a psychologist for 3 yrs, but recently stopped and a NP psychiatrist.\par Refractory to multiple medications and hospitalizations. \par In 2015, [facility] attempted ECT.   ECT x 8 treatments unhelpful.\par In 2016 [facility] told him they could no longer 'babysit' him and found placement in his present home and has been admitted for 6 years in the nursing home.  At the time he did not wish to return to his own home with his parents.  His mother in in her 90's living with a sister who is over 100.\par Covered by Medicare/medicaid and SSI.\par \par Prior trials, with failures: olanzepine, risperdal, aripirprazole, quetiapine.\par SSRI:  prozac, paxil, zoloft, lexapro, he only thing that works:  luvox\par wellbutrin.\par adderall, strattera.\par \par Neudexta is the only thing that is helping him. \par Does not recall being on: clozaril\par \par Minocycline which was to be used to decrease microglial activation, seemed to caused severe constipation, which was very alarming to him and stopped.\par \par He was able to have the LP performed as an outpatient, Feb 18, 2022.\par The test showed elevation in CSF protein and myelin-basic protein, but no other abnormalities, including from the encephalitis panel sent to [place].\par \par Prior tests:\par MRI brain Sep 30, 2021, EEG Oct 1, 2021 negative\par Following the elevated ASO and AChR anii-ganglionic antibody (alpha-3), though relatively low titer at 0.04, the [place] lab estimated a 46% chance it could be causing neurological disease.  As well there is an increased risk it is related to a neoplasm.\par \par Background:  at  7-7 yo, he believe he had strep throat, fevers to 107F, and he believes this led to PANDAS, in combination with the other two factors above, the genetic disposition and the guilt/sexual orientation issues while in [employer] school. \par \par He finds he needs to count all the time, everything he sees on the floor, and he needs to feel that everything is right and perfect and not affecting his life.  He has broken locks by rechecking them, though this has since passed.  He feels he is fixated on symmetry, but not fixated on specific numbers.\par \par He has vitiligo and eczema and inguinal itching as a child, which has changed over time, which he feels 'is a disaster'.  He also reports bowel movements are compulsive needing to feel completely empty.  He finds that his libido is increasing, and has always been very high.\par \par Seen by Dermatologists.  Has used steroid creams -kenalog, steroid creams.\par Has used allegra and claritin, but did not work for him, but perhaps zyrtec helped.

## 2022-09-01 NOTE — REASON FOR VISIT
[Home] : at home, [unfilled] , at the time of the visit. [Medical Office: ([facility])___] : at the medical office located in  [Verbal consent obtained from patient] : the patient, [unfilled] [Follow-Up: _____] : a [unfilled] follow-up visit

## 2023-12-28 NOTE — ASSESSMENT
[FreeTextEntry1] : 65M here for initial evaluation. He has h/o cerumen buildup in the setting of decreased hearing and has had his ears cleaned many times, but not recently. There is no otorrhea, otalgia, tinnitus or vertigo. On exam, both EACs were occluded w severe cerumen, removed w curet and suction with marked improvement in hearing after disimpaction. The rest of the head and neck exam is unremarkable.\par Severe cerumen removed - RTO 1yr for ear exam/cleaning. Prophylactic antibiotic